# Patient Record
(demographics unavailable — no encounter records)

---

## 2025-02-28 NOTE — HISTORY OF PRESENT ILLNESS
[TextBox_4] : PRESLEY is here today for evaluation. He was born at term after an unassisted conception and uneventful pregnancy and delivery. 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 No changes to the penis have been noted. No issues making ample wet diapers. No infections. No family history of penile abnormalities.

## 2025-03-10 NOTE — HISTORY OF PRESENT ILLNESS
[FreeTextEntry1] :  baby has been very fussy taking feeds ok , has episodes of crying that can last upto 4-5 hours parents tried warm heating pads  they are besides themselves in watching baby cry and are looking for tips and trick to help calm crying baby

## 2025-03-10 NOTE — DISCUSSION/SUMMARY
[Normal Growth] : growth [Normal Development] : development  [Term Infant] : term infant [FreeTextEntry1] : time spent counselling parents. they were prescribed baby pepcid 0.3 ml once a day at an urgent care suggest they increase to q 8 hrly for next 1-2 weeks to see response also suggested they can try switching formula to neutramigen instead of enfamil gentleease

## 2025-03-10 NOTE — PHYSICAL EXAM
[Alert] : alert [Acute Distress] : no acute distress [Normocephalic] : normocephalic [Flat Open Anterior Mouthcard] : flat open anterior fontanelle [PERRL] : PERRL [Red Reflex Bilateral] : red reflex bilateral [Normally Placed Ears] : normally placed ears [Auricles Well Formed] : auricles well formed [Clear Tympanic membranes] : clear tympanic membranes [Light reflex present] : light reflex present [Bony landmarks visible] : bony landmarks visible [Discharge] : no discharge [Nares Patent] : nares patent [Palate Intact] : palate intact [Uvula Midline] : uvula midline [Supple, full passive range of motion] : supple, full passive range of motion [Palpable Masses] : no palpable masses [Symmetric Chest Rise] : symmetric chest rise [Clear to Auscultation Bilaterally] : clear to auscultation bilaterally [Regular Rate and Rhythm] : regular rate and rhythm [S1, S2 present] : S1, S2 present [Murmurs] : no murmurs [+2 Femoral Pulses] : +2 femoral pulses [Soft] : soft [Tender] : nontender [Distended] : not distended [Bowel Sounds] : bowel sounds present [Hepatomegaly] : no hepatomegaly [Splenomegaly] : no splenomegaly [Normal external genitailia] : normal external genitalia [Central Urethral Opening] : central urethral opening [Testicles Descended Bilaterally] : testicles descended bilaterally [Normally Placed] : normally placed [No Abnormal Lymph Nodes Palpated] : no abnormal lymph nodes palpated [Beckwith-Ortolani] : negative Beckwith-Ortolani [Symmetric Flexed Extremities] : symmetric flexed extremities [Spinal Dimple] : no spinal dimple [Tuft of Hair] : no tuft of hair [Startle Reflex] : startle reflex present [Suck Reflex] : suck reflex present [Rooting] : rooting reflex present [Palmar Grasp] : palmar grasp reflex present [Plantar Grasp] : plantar grasp reflex present [Symmetric Walker] : symmetric Moose [Jaundice] : no jaundice [Rash and/or lesion present] : no rash/lesion